# Patient Record
Sex: MALE | Race: WHITE | Employment: OTHER | URBAN - METROPOLITAN AREA
[De-identification: names, ages, dates, MRNs, and addresses within clinical notes are randomized per-mention and may not be internally consistent; named-entity substitution may affect disease eponyms.]

---

## 2017-05-12 PROBLEM — D70.9 NEUTROPENIA (HCC): Status: ACTIVE | Noted: 2017-05-12

## 2017-05-12 PROBLEM — D70.9 NEUTROPENIA: Status: ACTIVE | Noted: 2017-05-12

## 2017-05-17 PROBLEM — D70.9 NEUTROPENIA, UNSPECIFIED TYPE (HCC): Status: ACTIVE | Noted: 2017-05-17

## 2017-05-17 PROBLEM — E66.01 SEVERE OBESITY (BMI 35.0-35.9 WITH COMORBIDITY) (HCC): Status: ACTIVE | Noted: 2017-05-17

## 2017-05-17 PROBLEM — D70.9 NEUTROPENIA (HCC): Status: RESOLVED | Noted: 2017-05-12 | Resolved: 2017-05-17

## 2017-05-17 PROBLEM — D70.9 NEUTROPENIA: Status: RESOLVED | Noted: 2017-05-12 | Resolved: 2017-05-17

## 2017-05-17 PROBLEM — D70.9 NEUTROPENIA, UNSPECIFIED TYPE: Status: ACTIVE | Noted: 2017-05-17

## 2017-08-04 PROCEDURE — 88305 TISSUE EXAM BY PATHOLOGIST: CPT | Performed by: INTERNAL MEDICINE

## 2017-09-15 PROCEDURE — 87147 CULTURE TYPE IMMUNOLOGIC: CPT | Performed by: UROLOGY

## 2017-09-15 PROCEDURE — 87086 URINE CULTURE/COLONY COUNT: CPT | Performed by: UROLOGY

## 2017-10-13 PROCEDURE — 36415 COLL VENOUS BLD VENIPUNCTURE: CPT | Performed by: UROLOGY

## 2017-10-13 PROCEDURE — 84153 ASSAY OF PSA TOTAL: CPT | Performed by: UROLOGY

## 2017-11-07 PROBLEM — E66.01 SEVERE OBESITY (BMI 35.0-35.9 WITH COMORBIDITY) (HCC): Status: RESOLVED | Noted: 2017-05-17 | Resolved: 2017-11-07

## 2017-11-17 PROBLEM — E66.812 CLASS 2 SEVERE OBESITY DUE TO EXCESS CALORIES WITH SERIOUS COMORBIDITY AND BODY MASS INDEX (BMI) OF 39.0 TO 39.9 IN ADULT (HCC): Status: ACTIVE | Noted: 2017-05-17

## 2017-11-17 PROBLEM — N40.1 BPH WITH OBSTRUCTION/LOWER URINARY TRACT SYMPTOMS: Status: ACTIVE | Noted: 2017-11-17

## 2017-11-17 PROBLEM — E66.01 CLASS 2 SEVERE OBESITY DUE TO EXCESS CALORIES WITH SERIOUS COMORBIDITY AND BODY MASS INDEX (BMI) OF 39.0 TO 39.9 IN ADULT (HCC): Status: ACTIVE | Noted: 2017-05-17

## 2017-11-17 PROBLEM — N13.8 BPH WITH OBSTRUCTION/LOWER URINARY TRACT SYMPTOMS: Status: ACTIVE | Noted: 2017-11-17

## 2018-01-17 PROBLEM — E66.01 MORBID OBESITY WITH BMI OF 40.0-44.9, ADULT (HCC): Status: ACTIVE | Noted: 2018-01-17

## 2018-01-17 PROBLEM — E66.01 CLASS 2 SEVERE OBESITY DUE TO EXCESS CALORIES WITH SERIOUS COMORBIDITY AND BODY MASS INDEX (BMI) OF 39.0 TO 39.9 IN ADULT (HCC): Status: RESOLVED | Noted: 2017-05-17 | Resolved: 2018-01-17

## 2018-01-17 PROBLEM — E66.812 CLASS 2 SEVERE OBESITY DUE TO EXCESS CALORIES WITH SERIOUS COMORBIDITY AND BODY MASS INDEX (BMI) OF 39.0 TO 39.9 IN ADULT (HCC): Status: RESOLVED | Noted: 2017-05-17 | Resolved: 2018-01-17

## 2018-10-09 PROCEDURE — 86803 HEPATITIS C AB TEST: CPT | Performed by: INTERNAL MEDICINE

## 2018-10-18 PROCEDURE — 87086 URINE CULTURE/COLONY COUNT: CPT | Performed by: UROLOGY

## 2018-10-18 PROCEDURE — 36415 COLL VENOUS BLD VENIPUNCTURE: CPT | Performed by: UROLOGY

## 2018-10-18 PROCEDURE — 84153 ASSAY OF PSA TOTAL: CPT | Performed by: UROLOGY

## 2018-10-18 PROCEDURE — 81015 MICROSCOPIC EXAM OF URINE: CPT | Performed by: UROLOGY

## 2019-05-01 PROBLEM — E88.81 INSULIN RESISTANCE: Status: ACTIVE | Noted: 2019-05-01

## 2019-05-01 PROBLEM — K76.0 FATTY LIVER: Status: ACTIVE | Noted: 2019-05-01

## 2019-05-01 PROBLEM — E88.819 INSULIN RESISTANCE: Status: ACTIVE | Noted: 2019-05-01

## 2019-05-02 PROCEDURE — 82397 CHEMILUMINESCENT ASSAY: CPT | Performed by: INTERNAL MEDICINE

## 2019-05-02 PROCEDURE — 86141 C-REACTIVE PROTEIN HS: CPT | Performed by: INTERNAL MEDICINE

## 2019-05-02 PROCEDURE — 36415 COLL VENOUS BLD VENIPUNCTURE: CPT | Performed by: INTERNAL MEDICINE

## 2019-08-21 PROCEDURE — 84156 ASSAY OF PROTEIN URINE: CPT | Performed by: INTERNAL MEDICINE

## 2019-08-21 PROCEDURE — 82570 ASSAY OF URINE CREATININE: CPT | Performed by: INTERNAL MEDICINE

## 2019-08-27 PROBLEM — E66.9 OBESITY (BMI 30-39.9): Status: ACTIVE | Noted: 2019-08-27

## 2019-08-27 PROBLEM — E66.01 MORBID OBESITY WITH BMI OF 40.0-44.9, ADULT (HCC): Status: RESOLVED | Noted: 2018-01-17 | Resolved: 2019-08-27

## 2019-08-28 PROCEDURE — 81001 URINALYSIS AUTO W/SCOPE: CPT | Performed by: INTERNAL MEDICINE

## 2020-01-31 PROBLEM — E66.01 MORBID OBESITY WITH BMI OF 40.0-44.9, ADULT (HCC): Status: RESOLVED | Noted: 2018-01-17 | Resolved: 2020-01-31

## 2020-06-29 PROBLEM — E66.01 SEVERE OBESITY (BMI 35.0-35.9 WITH COMORBIDITY) (HCC): Status: ACTIVE | Noted: 2020-06-29

## 2020-07-23 PROBLEM — E66.9 OBESITY (BMI 30-39.9): Status: RESOLVED | Noted: 2019-08-27 | Resolved: 2020-07-23

## 2020-08-31 PROBLEM — R73.03 PREDIABETES: Status: ACTIVE | Noted: 2020-08-31

## 2021-06-16 DIAGNOSIS — D72.819 LEUKOPENIA: Primary | ICD-10-CM

## 2021-06-16 PROCEDURE — 88341 IMHCHEM/IMCYTCHM EA ADD ANTB: CPT | Performed by: INTERNAL MEDICINE

## 2021-06-16 PROCEDURE — 88342 IMHCHEM/IMCYTCHM 1ST ANTB: CPT | Performed by: INTERNAL MEDICINE

## 2021-06-16 PROCEDURE — 88313 SPECIAL STAINS GROUP 2: CPT | Performed by: INTERNAL MEDICINE

## 2021-06-16 PROCEDURE — 85097 BONE MARROW INTERPRETATION: CPT | Performed by: INTERNAL MEDICINE

## 2021-06-16 PROCEDURE — 88305 TISSUE EXAM BY PATHOLOGIST: CPT | Performed by: INTERNAL MEDICINE

## 2021-07-21 PROBLEM — N18.31 STAGE 3A CHRONIC KIDNEY DISEASE (HCC): Status: ACTIVE | Noted: 2021-07-21

## 2021-07-21 PROBLEM — E66.01 SEVERE OBESITY (BMI 35.0-35.9 WITH COMORBIDITY) (HCC): Status: RESOLVED | Noted: 2020-06-29 | Resolved: 2021-07-21

## 2021-07-21 PROBLEM — I12.9 HYPERTENSIVE KIDNEY DISEASE WITH STAGE 3 CHRONIC KIDNEY DISEASE, UNSPECIFIED WHETHER STAGE 3A OR 3B CKD (HCC): Status: ACTIVE | Noted: 2021-07-21

## 2021-07-21 PROBLEM — N18.30 HYPERTENSIVE KIDNEY DISEASE WITH STAGE 3 CHRONIC KIDNEY DISEASE, UNSPECIFIED WHETHER STAGE 3A OR 3B CKD (HCC): Status: ACTIVE | Noted: 2021-07-21

## 2025-01-24 ENCOUNTER — HOSPITAL ENCOUNTER (OUTPATIENT)
Facility: HOSPITAL | Age: 75
Setting detail: OBSERVATION
Discharge: HOME | End: 2025-01-27
Attending: EMERGENCY MEDICINE | Admitting: HOSPITALIST
Payer: MEDICARE

## 2025-01-24 DIAGNOSIS — N17.9 AKI (ACUTE KIDNEY INJURY): ICD-10-CM

## 2025-01-24 DIAGNOSIS — R73.9 HYPERGLYCEMIA: Primary | ICD-10-CM

## 2025-01-24 DIAGNOSIS — E11.65 TYPE 2 DIABETES MELLITUS WITH HYPERGLYCEMIA, WITHOUT LONG-TERM CURRENT USE OF INSULIN (HCC): ICD-10-CM

## 2025-01-24 LAB
ALBUMIN SERPL-MCNC: 3.9 G/DL (ref 3.2–4.8)
ALBUMIN/GLOB SERPL: 0.9 {RATIO} (ref 1–2)
ALP LIVER SERPL-CCNC: 65 U/L
ALT SERPL-CCNC: 63 U/L
ANION GAP SERPL CALC-SCNC: 11 MMOL/L (ref 0–18)
AST SERPL-CCNC: 80 U/L (ref ?–34)
BASE EXCESS BLDV CALC-SCNC: 0 MMOL/L
BASOPHILS # BLD AUTO: 0.05 X10(3) UL (ref 0–0.2)
BASOPHILS NFR BLD AUTO: 1.1 %
BILIRUB SERPL-MCNC: 0.5 MG/DL (ref 0.2–1.1)
BILIRUB UR QL STRIP.AUTO: NEGATIVE
BUN BLD-MCNC: 43 MG/DL (ref 9–23)
CALCIUM BLD-MCNC: 9.2 MG/DL (ref 8.7–10.6)
CHLORIDE SERPL-SCNC: 97 MMOL/L (ref 98–112)
CO2 SERPL-SCNC: 24 MMOL/L (ref 21–32)
COLOR UR AUTO: YELLOW
CREAT BLD-MCNC: 2.09 MG/DL
EGFRCR SERPLBLD CKD-EPI 2021: 33 ML/MIN/1.73M2 (ref 60–?)
EOSINOPHIL # BLD AUTO: 0.18 X10(3) UL (ref 0–0.7)
EOSINOPHIL NFR BLD AUTO: 3.8 %
ERYTHROCYTE [DISTWIDTH] IN BLOOD BY AUTOMATED COUNT: 13.3 %
GLOBULIN PLAS-MCNC: 4.4 G/DL (ref 2–3.5)
GLUCOSE BLD-MCNC: 392 MG/DL (ref 70–99)
GLUCOSE BLD-MCNC: 457 MG/DL (ref 70–99)
GLUCOSE BLD-MCNC: 482 MG/DL (ref 70–99)
GLUCOSE BLD-MCNC: 519 MG/DL (ref 70–99)
GLUCOSE UR STRIP.AUTO-MCNC: >1000 MG/DL
HCO3 BLDV-SCNC: 24 MEQ/L (ref 22–26)
HCT VFR BLD AUTO: 39.1 %
HGB BLD-MCNC: 13.7 G/DL
IMM GRANULOCYTES # BLD AUTO: 0.02 X10(3) UL (ref 0–1)
IMM GRANULOCYTES NFR BLD: 0.4 %
KETONES UR STRIP.AUTO-MCNC: NEGATIVE MG/DL
LEUKOCYTE ESTERASE UR QL STRIP.AUTO: 500
LYMPHOCYTES # BLD AUTO: 1.36 X10(3) UL (ref 1–4)
LYMPHOCYTES NFR BLD AUTO: 28.6 %
MCH RBC QN AUTO: 31.6 PG (ref 26–34)
MCHC RBC AUTO-ENTMCNC: 35 G/DL (ref 31–37)
MCV RBC AUTO: 90.3 FL
MONOCYTES # BLD AUTO: 0.5 X10(3) UL (ref 0.1–1)
MONOCYTES NFR BLD AUTO: 10.5 %
NEUTROPHILS # BLD AUTO: 2.65 X10 (3) UL (ref 1.5–7.7)
NEUTROPHILS # BLD AUTO: 2.65 X10(3) UL (ref 1.5–7.7)
NEUTROPHILS NFR BLD AUTO: 55.6 %
NITRITE UR QL STRIP.AUTO: NEGATIVE
OSMOLALITY SERPL CALC.SUM OF ELEC: 306 MOSM/KG (ref 275–295)
OXYHGB MFR BLDV: 63.5 % (ref 72–78)
PCO2 BLDV: 43 MM HG (ref 38–50)
PH BLDV: 7.38 [PH] (ref 7.33–7.43)
PH UR STRIP.AUTO: 6 [PH] (ref 5–8)
PLATELET # BLD AUTO: 154 10(3)UL (ref 150–450)
PO2 BLDV: 39 MM HG (ref 30–50)
POTASSIUM SERPL-SCNC: 4.9 MMOL/L (ref 3.5–5.1)
PROT SERPL-MCNC: 8.3 G/DL (ref 5.7–8.2)
PROT UR STRIP.AUTO-MCNC: 30 MG/DL
RBC # BLD AUTO: 4.33 X10(6)UL
RBC #/AREA URNS AUTO: >10 /HPF
SODIUM SERPL-SCNC: 132 MMOL/L (ref 136–145)
SP GR UR STRIP.AUTO: 1.02 (ref 1–1.03)
UROBILINOGEN UR STRIP.AUTO-MCNC: NORMAL MG/DL
WBC # BLD AUTO: 4.8 X10(3) UL (ref 4–11)
WBC #/AREA URNS AUTO: >50 /HPF

## 2025-01-24 RX ORDER — INSULIN ASPART 100 [IU]/ML
0.1 INJECTION, SOLUTION INTRAVENOUS; SUBCUTANEOUS ONCE
Status: COMPLETED | OUTPATIENT
Start: 2025-01-24 | End: 2025-01-24

## 2025-01-24 RX ORDER — TAMSULOSIN HYDROCHLORIDE 0.4 MG/1
0.4 CAPSULE ORAL EVERY MORNING
COMMUNITY

## 2025-01-24 RX ORDER — TOLTERODINE 4 MG/1
4 CAPSULE, EXTENDED RELEASE ORAL DAILY
COMMUNITY

## 2025-01-25 PROBLEM — N17.9 AKI (ACUTE KIDNEY INJURY): Status: ACTIVE | Noted: 2025-01-25

## 2025-01-25 LAB
ANION GAP SERPL CALC-SCNC: 10 MMOL/L (ref 0–18)
BUN BLD-MCNC: 35 MG/DL (ref 9–23)
CALCIUM BLD-MCNC: 8.9 MG/DL (ref 8.7–10.6)
CHLORIDE SERPL-SCNC: 105 MMOL/L (ref 98–112)
CO2 SERPL-SCNC: 22 MMOL/L (ref 21–32)
CREAT BLD-MCNC: 1.68 MG/DL
CREAT UR-SCNC: 91.6 MG/DL
EGFRCR SERPLBLD CKD-EPI 2021: 43 ML/MIN/1.73M2 (ref 60–?)
ERYTHROCYTE [DISTWIDTH] IN BLOOD BY AUTOMATED COUNT: 13.7 %
EST. AVERAGE GLUCOSE BLD GHB EST-MCNC: 312 MG/DL (ref 68–126)
GLUCOSE BLD-MCNC: 190 MG/DL (ref 70–99)
GLUCOSE BLD-MCNC: 206 MG/DL (ref 70–99)
GLUCOSE BLD-MCNC: 227 MG/DL (ref 70–99)
GLUCOSE BLD-MCNC: 281 MG/DL (ref 70–99)
GLUCOSE BLD-MCNC: 299 MG/DL (ref 70–99)
GLUCOSE BLD-MCNC: 503 MG/DL (ref 70–99)
HBA1C MFR BLD: 12.5 % (ref ?–5.7)
HCT VFR BLD AUTO: 39 %
HGB BLD-MCNC: 13.3 G/DL
MAGNESIUM SERPL-MCNC: 2 MG/DL (ref 1.6–2.6)
MCH RBC QN AUTO: 31.6 PG (ref 26–34)
MCHC RBC AUTO-ENTMCNC: 34.1 G/DL (ref 31–37)
MCV RBC AUTO: 92.6 FL
OSMOLALITY SERPL CALC.SUM OF ELEC: 298 MOSM/KG (ref 275–295)
PLATELET # BLD AUTO: 155 10(3)UL (ref 150–450)
POTASSIUM SERPL-SCNC: 4.6 MMOL/L (ref 3.5–5.1)
RBC # BLD AUTO: 4.21 X10(6)UL
SODIUM SERPL-SCNC: 137 MMOL/L (ref 136–145)
SODIUM SERPL-SCNC: 50 MMOL/L
WBC # BLD AUTO: 4.7 X10(3) UL (ref 4–11)

## 2025-01-25 RX ORDER — FINASTERIDE 5 MG/1
5 TABLET, FILM COATED ORAL
Status: DISCONTINUED | OUTPATIENT
Start: 2025-01-25 | End: 2025-01-27

## 2025-01-25 RX ORDER — HEPARIN SODIUM 5000 [USP'U]/ML
5000 INJECTION, SOLUTION INTRAVENOUS; SUBCUTANEOUS EVERY 8 HOURS SCHEDULED
Status: DISCONTINUED | OUTPATIENT
Start: 2025-01-25 | End: 2025-01-27

## 2025-01-25 RX ORDER — NICOTINE POLACRILEX 4 MG
15 LOZENGE BUCCAL
Status: DISCONTINUED | OUTPATIENT
Start: 2025-01-25 | End: 2025-01-27

## 2025-01-25 RX ORDER — SODIUM CHLORIDE 9 MG/ML
INJECTION, SOLUTION INTRAVENOUS CONTINUOUS
Status: DISCONTINUED | OUTPATIENT
Start: 2025-01-25 | End: 2025-01-26

## 2025-01-25 RX ORDER — DEXTROSE MONOHYDRATE 25 G/50ML
50 INJECTION, SOLUTION INTRAVENOUS
Status: DISCONTINUED | OUTPATIENT
Start: 2025-01-25 | End: 2025-01-27

## 2025-01-25 RX ORDER — CARVEDILOL 12.5 MG/1
25 TABLET ORAL 2 TIMES DAILY WITH MEALS
Status: DISCONTINUED | OUTPATIENT
Start: 2025-01-25 | End: 2025-01-27

## 2025-01-25 RX ORDER — TOPIRAMATE 25 MG/1
50 TABLET, FILM COATED ORAL 2 TIMES DAILY
Status: DISCONTINUED | OUTPATIENT
Start: 2025-01-25 | End: 2025-01-25 | Stop reason: ALTCHOICE

## 2025-01-25 RX ORDER — ACETAMINOPHEN 500 MG
500 TABLET ORAL EVERY 4 HOURS PRN
Status: DISCONTINUED | OUTPATIENT
Start: 2025-01-25 | End: 2025-01-27

## 2025-01-25 RX ORDER — CLONIDINE HYDROCHLORIDE 0.1 MG/1
0.1 TABLET ORAL 2 TIMES DAILY
Status: DISCONTINUED | OUTPATIENT
Start: 2025-01-25 | End: 2025-01-27

## 2025-01-25 RX ORDER — ONDANSETRON 2 MG/ML
4 INJECTION INTRAMUSCULAR; INTRAVENOUS EVERY 4 HOURS PRN
Status: ACTIVE | OUTPATIENT
Start: 2025-01-25 | End: 2025-01-25

## 2025-01-25 RX ORDER — NICOTINE POLACRILEX 4 MG
30 LOZENGE BUCCAL
Status: DISCONTINUED | OUTPATIENT
Start: 2025-01-25 | End: 2025-01-27

## 2025-01-25 RX ORDER — INSULIN DEGLUDEC 100 U/ML
20 INJECTION, SOLUTION SUBCUTANEOUS NIGHTLY
Status: DISCONTINUED | OUTPATIENT
Start: 2025-01-25 | End: 2025-01-26

## 2025-01-25 NOTE — ED INITIAL ASSESSMENT (HPI)
Patient endorses PCP visit today. He had blood work done and advised to come in for Evaluation of Hyperglycemia. Patient notes increased thirst and weight loss. + Urinary Frequency. Glucose on his CMP from PCP was 406. No hx of DM2

## 2025-01-25 NOTE — PLAN OF CARE
Patient is Aox4, VSS on RA, denies any pain at this time, IV fluids are going per order, IV rocephin for UTI, Accuchecks per order, insulin coverage per sliding scale, monitoring labs, call light and belongings within reach.

## 2025-01-25 NOTE — ED PROVIDER NOTES
Patient Seen in: [facility] Emergency Department      History     Chief Complaint   Patient presents with    Hyperglycemia     Stated Complaint: BG >400 per MD    Subjective:   HPI      75-year-old male presents today for evaluation of abnormal labs.  Patient was called by his primary care doctor today because his glucose was noted to be 406.  Patient states he has had urinary frequency for a long time.  He also reports fatigue as well.  He denies any fevers, pain, vomiting or dysuria.  He denies any previous history of diabetes.    Objective:     Past Medical History:    BPH (benign prostatic hyperplasia)    Elevated prostate specific antigen (PSA)    HYPERTENSION    Morbid obesity with BMI of 40.0-44.9, adult (HCC)    Obesity (BMI 30-39.9)    UTI (urinary tract infection)    Visual impairment    cataracts              Past Surgical History:   Procedure Laterality Date    Cataract      Colonoscopy N/A 8/4/2017    Procedure: COLONOSCOPY, POSSIBLE BIOPSY, POSSIBLE POLYPECTOMY 62965;  Surgeon: Michi Mercado MD;  Location: [facility]    Other surgical history  4/18/08    prostate bx-Dr. Saini    Other surgical history  1986    varicocelectomy    Other surgical history  3/5/15    Cysto-Dr. Saini     Other surgical history  3/18/15    Cysto, TURP w/ Bipolar- Dr. Saini    Other surgical history  3/18/2016    Flow  - Dr. Saini     Other surgical history  12/30/16    Flow  Dr Saini    Remv cataract extracap,insert lens  10/26/2011    Performed by SUSANA VIVAS at [facility]                Social History     Socioeconomic History    Marital status:    Tobacco Use    Smoking status: Never    Smokeless tobacco: Never   Vaping Use    Vaping status: Never Used   Substance and Sexual Activity    Alcohol use: Not Currently     Alcohol/week: 0.0 - 1.0 standard drinks of alcohol     Comment: occ    Drug use: No                  Physical Exam     ED Triage Vitals [01/24/25 1854]   /81   Pulse  77   Resp 18   Temp 97.2 °F (36.2 °C)   Temp src Temporal   SpO2 97 %   O2 Device None (Room air)       Current Vitals:   Vital Signs  BP: (!) 147/97  Pulse: 75  Resp: 17  Temp: 97.2 °F (36.2 °C)  Temp src: Temporal  MAP (mmHg): (!) 109    Oxygen Therapy  SpO2: 100 %  O2 Device: None (Room air)        Physical Exam  Vitals and nursing note reviewed.   Constitutional:       Appearance: Normal appearance.   HENT:      Head: Normocephalic.      Nose: Nose normal.      Mouth/Throat:      Mouth: Mucous membranes are moist.   Eyes:      Extraocular Movements: Extraocular movements intact.   Cardiovascular:      Rate and Rhythm: Normal rate.   Pulmonary:      Effort: Pulmonary effort is normal.   Abdominal:      General: Abdomen is flat.   Musculoskeletal:         General: Normal range of motion.   Skin:     General: Skin is warm.   Neurological:      General: No focal deficit present.      Mental Status: He is alert.   Psychiatric:         Mood and Affect: Mood normal.             ED Course     Labs Reviewed   COMP METABOLIC PANEL (14) - Abnormal; Notable for the following components:       Result Value    Glucose 482 (*)     Sodium 132 (*)     Chloride 97 (*)     BUN 43 (*)     Creatinine 2.09 (*)     Calculated Osmolality 306 (*)     eGFR-Cr 33 (*)     AST 80 (*)     ALT 63 (*)     Total Protein 8.3 (*)     Globulin  4.4 (*)     A/G Ratio 0.9 (*)     All other components within normal limits   VENOUS BLOOD GAS - Abnormal; Notable for the following components:    Venous O2Hb 63.5 (*)     All other components within normal limits   POCT GLUCOSE - Abnormal; Notable for the following components:    POC Glucose 519 (*)     All other components within normal limits   CBC WITH DIFFERENTIAL WITH PLATELET   URINALYSIS, ROUTINE   RAINBOW DRAW LAVENDER   RAINBOW DRAW LIGHT GREEN   RAINBOW DRAW BLUE     EKG    Rate, intervals and axes as noted on EKG Report.  Rate: 77  Rhythm: Sinus Rhythm  Reading: No STEMI                        MDM      Differential Diagnosis  75-year-old male was recently experienced fatigue and polyuria presents today for evaluation of hyperglycemia noted on outpatient labs.  He is hemodynamically stable in no acute distress.  Will obtain labs to assess for signs of DKA or HHS.  Plan for UA to assess for UTI.  Will give fluids and reassess.    9:00 pm  Patient's pH, bicarb and anion gap are normal.  Presently, I do not suspect DKA.  His glucose is 482, and improved with IV fluids and insulin.  His BUN is 43 and his creatinine is 2.09, higher than previous levels.  I suspect some JOSSELYN from his polyuria secondary to hyperglycemia.  Will admit to the hospital for further care.  I notified the hospitalist of need for admission.    External Chart Reviewed  I reviewed his outpatient labs performed earlier today    Discussions of Management  I notified the hospitalist of need for admission.        MDM    Disposition and Plan     Clinical Impression:  1. Hyperglycemia    2. JOSSELYN (acute kidney injury) (HCC)         Disposition:  There is no disposition on file for this visit.  There is no disposition time on file for this visit.    Follow-up:  No follow-up provider specified.        Medications Prescribed:  Current Discharge Medication List              Supplementary Documentation:

## 2025-01-25 NOTE — SPIRITUAL CARE NOTE
Spiritual Care Visit Note    Patient Name: Louis Gibson Date of Spiritual Care Visit: 25   : 1950 Primary Dx: Hyperglycemia   Referred By: ED rounds    Spiritual Care Taxonomy:    Intended Effects: Demonstrate caring and concern    Methods: Encourage sharing of feelings;Encourage story-telling;Explore cultural values;Offer support    Interventions: Acknowledge current situation;Active listening;Ask guided questions;Ask questions to bring forth feelings;Identify supportive relationship(s);Explain  role;Discuss concerns;Discuss plan of care;Share words of hope and inspiration    Visit Type/Summary:  Louis was sitting up in bed and visiting with his wife when  arrived at his room.  He discussed how he got to ED, knew the prognosis but had not yet formed a lifestyle plan to deal with his health issues.  He and his wife seemed in good spirits, were very supportive of each other and their respective family/community,  He seems to have a good partner in his wife who will help him adhere to healthy eating habits.  Both feel he is very active in the summer with their yard, but may not have developed a good exercise program or activity in the winter - \"unless it snows.\"   - Spiritual Care: Consulted with RN prior to visit. Offered empathic listening and emotional support. Provided information regarding how to contact Spiritual Care.  remains available as needed for follow up.    Spiritual Care support can be requested via an Epic consult. For urgent/immediate needs, please contact the On Call  at: [facility]: ext [phone]    Brenda Molina MDiv.  Staff

## 2025-01-25 NOTE — ED QUICK NOTES
Orders for admission, patient is aware of plan and ready to go upstairs. Any questions, please call ED RN Elizabeth at extension [phone].     Patient Covid vaccination status: Fully vaccinated     COVID Test Ordered in ED: None    COVID Suspicion at Admission: N/A    Running Infusions:      Mental Status/LOC at time of transport: x4    Other pertinent information:   CIWA score: N/A   NIH score:  N/A

## 2025-01-26 LAB
ALBUMIN SERPL-MCNC: 3.9 G/DL (ref 3.2–4.8)
ALBUMIN/GLOB SERPL: 0.9 {RATIO} (ref 1–2)
ALP LIVER SERPL-CCNC: 55 U/L
ALT SERPL-CCNC: 61 U/L
ANION GAP SERPL CALC-SCNC: 10 MMOL/L (ref 0–18)
AST SERPL-CCNC: 89 U/L (ref ?–34)
BASOPHILS # BLD AUTO: 0.05 X10(3) UL (ref 0–0.2)
BASOPHILS NFR BLD AUTO: 1.3 %
BILIRUB SERPL-MCNC: 0.6 MG/DL (ref 0.2–1.1)
BUN BLD-MCNC: 28 MG/DL (ref 9–23)
CALCIUM BLD-MCNC: 8.7 MG/DL (ref 8.7–10.6)
CHLORIDE SERPL-SCNC: 108 MMOL/L (ref 98–112)
CO2 SERPL-SCNC: 22 MMOL/L (ref 21–32)
CREAT BLD-MCNC: 1.65 MG/DL
EGFRCR SERPLBLD CKD-EPI 2021: 44 ML/MIN/1.73M2 (ref 60–?)
EOSINOPHIL # BLD AUTO: 0.18 X10(3) UL (ref 0–0.7)
EOSINOPHIL NFR BLD AUTO: 4.6 %
ERYTHROCYTE [DISTWIDTH] IN BLOOD BY AUTOMATED COUNT: 13.8 %
GLOBULIN PLAS-MCNC: 4.3 G/DL (ref 2–3.5)
GLUCOSE BLD-MCNC: 185 MG/DL (ref 70–99)
GLUCOSE BLD-MCNC: 206 MG/DL (ref 70–99)
GLUCOSE BLD-MCNC: 222 MG/DL (ref 70–99)
GLUCOSE BLD-MCNC: 224 MG/DL (ref 70–99)
GLUCOSE BLD-MCNC: 296 MG/DL (ref 70–99)
HCT VFR BLD AUTO: 37.5 %
HGB BLD-MCNC: 13.1 G/DL
IMM GRANULOCYTES # BLD AUTO: 0.01 X10(3) UL (ref 0–1)
IMM GRANULOCYTES NFR BLD: 0.3 %
LYMPHOCYTES # BLD AUTO: 1.73 X10(3) UL (ref 1–4)
LYMPHOCYTES NFR BLD AUTO: 43.9 %
MCH RBC QN AUTO: 32.3 PG (ref 26–34)
MCHC RBC AUTO-ENTMCNC: 34.9 G/DL (ref 31–37)
MCV RBC AUTO: 92.4 FL
MONOCYTES # BLD AUTO: 0.68 X10(3) UL (ref 0.1–1)
MONOCYTES NFR BLD AUTO: 17.3 %
NEUTROPHILS # BLD AUTO: 1.29 X10 (3) UL (ref 1.5–7.7)
NEUTROPHILS # BLD AUTO: 1.29 X10(3) UL (ref 1.5–7.7)
NEUTROPHILS NFR BLD AUTO: 32.6 %
OSMOLALITY SERPL CALC.SUM OF ELEC: 300 MOSM/KG (ref 275–295)
PLATELET # BLD AUTO: 147 10(3)UL (ref 150–450)
POTASSIUM SERPL-SCNC: 4.9 MMOL/L (ref 3.5–5.1)
PROT SERPL-MCNC: 8.2 G/DL (ref 5.7–8.2)
RBC # BLD AUTO: 4.06 X10(6)UL
SODIUM SERPL-SCNC: 140 MMOL/L (ref 136–145)
WBC # BLD AUTO: 3.9 X10(3) UL (ref 4–11)

## 2025-01-26 RX ORDER — INSULIN DEGLUDEC 100 U/ML
24 INJECTION, SOLUTION SUBCUTANEOUS NIGHTLY
Status: DISCONTINUED | OUTPATIENT
Start: 2025-01-26 | End: 2025-01-27

## 2025-01-26 NOTE — DIETARY NOTE
[facility]   part of [facility]   CLINICAL NUTRITION    Louis Gibson     Admitting diagnosis:  Hyperglycemia [R73.9]  JOSSELYN (acute kidney injury) (HCC) [N17.9]    Ht: 175.3 cm (5' 9.02\")  Wt: 113.4 kg (250 lb).   Body mass index is 36.9 kg/m².  IBW: 72.7kg    Wt Readings from Last 6 Encounters:   01/25/25 113.4 kg (250 lb)   04/06/22 122.8 kg (270 lb 12.8 oz)   03/24/22 123.5 kg (272 lb 3.2 oz)   01/24/22 121.1 kg (267 lb)   01/21/22 123.8 kg (273 lb)   10/25/21 118.4 kg (261 lb)        Labs/Meds reviewed    Diet:       Procedures    Carbohydrate controlled diet 1800 kcal/60 grams; Is Patient on Accuchecks? Yes     Percent Meals Eaten (last 3 days)       Date/Time Percent Meals Eaten (%)    01/25/25 0830 100 %    01/26/25 0755 100 %            Pt chart reviewed d/t hyperglycemia.  75 year old male admitted with hyperglycemia and new DM dx  Visited this AM and reviewed CHO containing foods, CHO/protein and fat in BS control, importance of portion control, timing of meals and appropriate beverages. Admits to drinking large amounts of milk and eating big portions of fruit. Pt had a readiness to learn-no barriers noted- motivated and receptive. Will follow up when wife is present.   Patient reports good appetite at this time.  Nursing notes reports Percent Meals Eaten (%): 100 % intake for last meal.  Tolerating po diet without diarrhea, emesis, or constipation.   No significant weight changes noted per EPIC though pt states he lost about 17#.     Patient is at low nutrition risk at this time.    Please consult if patient status changes or nutrition issues arise.    Elenita Rodrigez RD, LDN  Clinical Nutrition  [phone]

## 2025-01-26 NOTE — DISCHARGE INSTRUCTIONS
Your hemoglobin A1c level came back as Last A1c value was 12.5% done 1/24/2025.  . If you are not already following a diabetic diet, it is recommended that you begin to do so. If you feel you need further help with managing your diet, an appointment with the diabetes learning center is recommended. The learning center can help you meal plan as well as find foods that work for you within your diet restrictions. Please let your doctor know if you are interested in the diabetes learning center. An MD consult will be needed.     Sliding Scale to follow after testing blood glucose:   This dose of insulin is given in addition to the 8 units of insulin injected three times daily with meals.    Give 1 unit for blood glucose 140-160 mg/dL  Give 2 units for blood glucose 161-180 mg/dL  Give 3 units for blood glucose 181-200 mg/dL  Give 4 units for blood glucose 201-220 mg/dL  Give 5 units for blood glucose 221-240 mg/dL  Give 6 units for blood glucose 241-260 mg/dL  Give 7 units for blood glucose 261-280 mg/dL  Give 8 units for blood glucose 281-300 mg/dL  Give 9 units for blood glucose 301-320 mg/dL  Give 10 units for blood glucose 321-340 mg/dL  Give 11 units for blood glucose 341-360 mg/dL  Call physician if blood glucose is greater than 360 mg/dL with time and last dose of correction insulin given.     Type 2 Diabetes Nutrition Therapy    Why Is Carbohydrate Counting Important?  Counting carbohydrate servings may help you control your blood glucose level so you feel better.  The balance between the carbohydrates you eat and insulin determines what your blood glucose level will be after eating.  Carbohydrate counting can also help you plan your meals.    Which Foods Have Carbohydrates?  Foods with carbohydrates include:     Breads, crackers, and cereals  Pasta, rice, and grains  Starchy vegetables, such as potatoes, corn, and peas  Beans and legumes  Milk, soy milk, and yogurt  Fruits and fruit juices  Sweets, such as  cakes, cookies, ice cream, jam, and jelly    Carbohydrate Servings  In diabetes meal planning, 1 serving of a food with carbohydrate has about 15 grams of carbohydrate:  Check serving sizes with measuring cups and spoons or a food scale.  Read the Nutrition Facts on food labels to find out how many grams of carbohydrate are in foods you eat.  The food lists in this handout show portions that have about 15 grams of carbohydrate.    Meal Planning Tips  An Eating Plan tells you how many carbohydrate servings to eat at your meals and snacks. For many adults, eating 3 to 5 servings of carbohydrate foods at each meal and 1 or 2 carbohydrate servings for each snack works well.    ~In a healthy daily Eating Plan, most carbohydrates come from:  At least 6 servings of fruits and nonstarchy vegetables  At least 6 servings of grains, beans, and starchy vegetables, with at least 3 servings from whole grains  At least 2 servings of milk or milk products  ~Check your blood glucose level regularly. It can tell you if you need to adjust when you eat carbohydrates.  ~Eating foods that have fiber, such as whole grains, and having very few salty foods is good for your health.  ~Eat 4 to 6 ounces of meat or other protein foods (such as soybean burgers) each day. Choose low-fat sources of protein, such as lean beef, lean pork, chicken, fish, low-fat cheese, or vegetarian foods such as soy.  ~Eat some healthy fats, such as olive oil, canola oil, and nuts.  ~Eat very little saturated fats. These unhealthy fats are found in butter, cream, and high-fat meats, such as leroy and sausage.  ~Eat very little or no trans fats. These unhealthy fats are found in all foods that list “partially hydrogenated oil” as an ingredient.    Label Reading Tips  The Nutrition Facts panel on a label lists the grams of total carbohydrate in 1 standard serving. The label’s standard serving may be larger or smaller than 1 carbohydrate serving.    To figure out how  many carbohydrate servings are in the food:    First, look at the label’s standard serving size.  Check the grams of total carbohydrate. This is the amount of carbohydrate in 1 standard serving.  Divide the grams of total carbohydrate by 15. This number equals the number of carbohydrate servings in 1 standard serving. Remember: 1 carbohydrate serving is 15 grams of carbohydrate.  Note: You may ignore the grams of sugars on the Nutrition Facts panel because they are included in the grams of total carbohydrate.    Foods Recommended  1 serving = about 15 grams of carbohydrate    Grains   1 slice bread (1 ounce)  1 tortilla (6-inch size)  ¼ large bagel (1 ounce)  2 taco shells (5-inch size)  ½ hamburger or hot dog bun (¾ ounce)  ¾ cup ready-to-eat unsweetened cereal  ½ cup cooked cereal  1 cup broth-based soup  4 to 6 small crackers  1/3 cup pasta or rice (cooked)  ½ cup beans, peas, corn, sweet potatoes, winter squash, or mashed or boiled potatoes (cooked)  ¼ large baked potato (3 ounces)  ¾ ounce pretzels, potato chips, or tortilla chips  3 cups popcorn (popped)    Fruit  1 small fresh fruit (¾ to 1 cup)  ½ cup canned or frozen fruit  2 tablespoons dried fruit (blueberries, cherries, cranberries, mixed fruit, raisins)  17 small grapes (3 ounces)  1 cup melon, berries  ½ cup unsweetened fruit juice    Milk  1 cup fat-free or reduced-fat milk  1 cup soy milk  2/3 cup (6 ounces) nonfat yogurt sweetened with sugar-free sweetener  Sweets and Desserts  2-inch square cake (unfrosted)  2 small cookies (2/3 ounce)  ½ cup ice cream or frozen yogurt  ¼ cup sherbet or sorbet  1 tablespoon syrup, jam, jelly, table sugar, or honey  2 tablespoons light syrup    Other Foods  Count 1 cup raw vegetables or ½ cup cooked nonstarchy vegetables as zero (0) carbohydrate servings or “free” foods. If you eat 3 or more servings at one meal, count them as 1 carbohydrate serving.  Foods that have less than 20 calories in each serving also may be  counted as zero carbohydrate servings or “free” foods.  Count 1 cup of casserole or other mixed foods as 2 carbohydrate servings.      Type 2 Diabetes Sample 1-Day Menu   Breakfast  1 slice whole grain toast (1 carbohydrate serving)  1 teaspoon trans-fat free margarine  1 egg omelet  1 orange (1 carbohydrate serving)  6 ounces low-fat, plain Greek yogurt (1 carbohydrate serving)    Lunch  2 ounces turkey breast  2 slices whole grain bread (2 carbohydrate servings)  Lettuce and tomato salad  1 small apple (1 carbohydrate serving)  1 cup cucumber slices (0.5 carbohydrate serving)    Evening Meal  3 ounces baked chicken  1 cup baked, mashed sweet potato (2 carbohydrate serving)  1/2 cup cooked broccoli  1 large green salad  1 cup fat-free skim milk (1 carbohydrate serving)  1 cup fresh raspberries (1 carbohydrate serving)    Evening Snack  1 tablespoon nuts  1/2 cup ice cream (1 carbohydrate serving)  1-1/4 cups strawberries (1 carbohydrate serving)    Elenita Rodrigez, RD  [phone]    Follow up for Diabetes care:    It is recommended that you follow up with an outpatient diabetes center.   Please obtain a referral from your primary care doctor if you do not already have one.   Below is the location and number of the [facility] nearest you.     [place] location:  [address]  [phone]    [place] location:  [address]  [phone]    [place] location:  [address]   [phone]    [place] location:  [address]   [phone]    [place] location:  [address]  [phone]    [place] location:  [address]  [phone]

## 2025-01-26 NOTE — PLAN OF CARE
A/Ox4, VSS, on RA. Pt denies any pain or discomfort.  tonight, Novolog per SS and Degludec Insulin given. Cont IV abx and IVF. Pt updated with plan of care, verbalized understanding.

## 2025-01-26 NOTE — PLAN OF CARE
Pt A&Ox4. VSS on RA. . Telemetry monitoring, NSR. SCD's declined. Up independently in room. Blood glucose treated per protocol, Novolog with meals as ordered. Diabetes education handouts given to pt. Further educated on importance of logging and treating blood glucose at home, verbalizes understanding. Plan for diabetes APRN to see tomorrow. Call light within reach. Up in chair at this time. Will continue to monitor.

## 2025-01-26 NOTE — PROGRESS NOTES
[facility] Hospitalist Progress Note     PCP: Pat Granger MD    SUBJECTIVE:  No CP, SOB, or N/V.    OBJECTIVE:  Temp:  [97.3 °F (36.3 °C)-98.2 °F (36.8 °C)] 97.7 °F (36.5 °C)  Pulse:  [57-73] 64  Resp:  [16-18] 16  BP: (103-135)/(47-79) 124/74  SpO2:  [95 %-98 %] 96 %    Intake/Output:    Intake/Output Summary (Last 24 hours) at 1/26/2025 1517  Last data filed at 1/26/2025 0755  Gross per 24 hour   Intake 1210 ml   Output 750 ml   Net 460 ml       Last 3 Weights   01/25/25 0023 250 lb (113.4 kg)   01/24/25 1854 250 lb (113.4 kg)   04/06/22 0849 270 lb 12.8 oz (122.8 kg)   03/24/22 0916 272 lb 3.2 oz (123.5 kg)       Exam  Physical Exam:  General: Alert, awake, cooperative.  HEENT:  Normocephalic, atraumatic.  Neck:  Trachea midline.  Neck supple.  Chest:  Clear to auscultation bilaterally. No wheezes, rales, or rhonchi.  CV:  Regular rate and rhythm.  Positive S1/S2. No murmur, no gallops, no rubs  GI: Bowel sounds present in all four quadrants, abdomen is soft, non-tender, non-distended.  Extremities:  No lower extremity edema or cyanosis.  Neurological:  AAOx4.  Moving all extremities.  Skin:  Warm and dry.      Data Review:       Labs:     Recent Labs   Lab 01/24/25 1859 01/25/25  0626 01/26/25  0500   WBC 4.8 4.7 3.9*   HGB 13.7 13.3 13.1   MCV 90.3 92.6 92.4   .0 155.0 147.0*       Recent Labs   Lab 01/24/25 1859 01/25/25  0626 01/26/25  0500   * 137 140   K 4.9 4.6 4.9   CL 97* 105 108   CO2 24.0 22.0 22.0   BUN 43* 35* 28*   CREATSERUM 2.09* 1.68* 1.65*   CA 9.2 8.9 8.7   MG  --  2.0  --    * 206* 185*       Recent Labs   Lab 01/24/25  1859 01/26/25  0500   ALT 63* 61*   AST 80* 89*   ALB 3.9 3.9       Recent Labs   Lab 01/25/25  1401 01/25/25  1829 01/25/25  2113 01/26/25  0506 01/26/25  1158   PGLU 281* 503* 190* 224* 296*       No results for input(s): \"TROP\" in the last 168 hours.        Meds:      insulin degludec  24 Units Subcutaneous Nightly    insulin aspart  8 Units Subcutaneous  TID CC    carvedilol  25 mg Oral BID with meals    cloNIDine  0.1 mg Oral BID    finasteride  5 mg Oral Daily    heparin  5,000 Units Subcutaneous Q8H LILA    cefTRIAXone  1 g Intravenous Q24H    insulin aspart  2-10 Units Subcutaneous TID AC and HS         acetaminophen    glucose **OR** glucose **OR** glucose-vitamin C **OR** dextrose **OR** glucose **OR** glucose **OR** glucose-vitamin C       Assessment/Plan:   75 year old male with PMH sig for HTN, BPH, DULCE MARIA who presents for abnormal labs.      #Acute cystitis  #Generalized weakness  -Continue Rocephin.  Urine culture was not ordered in the ED.  Added onto existing specimen.  -Encourage ambulation     #T2DM w/ hyperglycemia  HbA1C 12.5  -Start weight-based MDI regimen, degludec increased to 24 units nightly, NovoLog 8 units with meals, SSI  -Endocrinology consulted.  Patient will need further education regarding his diabetes and insulin.     #JOSSELYN on CKD, resolved  -Likely due to volume depletion from polyuria.  Discontinue IV fluids.  -Trend renal function     #HTN  -Continue Coreg and clonidine  -Hold lisinopril and Lasix for now    #BPH  -Continue     #BPH  -Flomax and finasteride        DVT prophylaxis: Heparin subcu  CODE STATUS: Full code    Vaughn Norwood DO  [facility]

## 2025-01-27 ENCOUNTER — TELEPHONE (OUTPATIENT)
Dept: ENDOCRINOLOGY | Facility: HOSPITAL | Age: 75
End: 2025-01-27

## 2025-01-27 VITALS
WEIGHT: 250 LBS | SYSTOLIC BLOOD PRESSURE: 127 MMHG | TEMPERATURE: 98 F | RESPIRATION RATE: 17 BRPM | OXYGEN SATURATION: 96 % | BODY MASS INDEX: 37.03 KG/M2 | HEIGHT: 69.02 IN | HEART RATE: 61 BPM | DIASTOLIC BLOOD PRESSURE: 79 MMHG

## 2025-01-27 DIAGNOSIS — E11.65 TYPE 2 DIABETES MELLITUS WITH HYPERGLYCEMIA, WITHOUT LONG-TERM CURRENT USE OF INSULIN (HCC): Primary | ICD-10-CM

## 2025-01-27 LAB
ANION GAP SERPL CALC-SCNC: 11 MMOL/L (ref 0–18)
BASOPHILS # BLD AUTO: 0.05 X10(3) UL (ref 0–0.2)
BASOPHILS NFR BLD AUTO: 1.3 %
BUN BLD-MCNC: 20 MG/DL (ref 9–23)
CALCIUM BLD-MCNC: 8.9 MG/DL (ref 8.7–10.6)
CHLORIDE SERPL-SCNC: 109 MMOL/L (ref 98–112)
CO2 SERPL-SCNC: 21 MMOL/L (ref 21–32)
CREAT BLD-MCNC: 1.47 MG/DL
EGFRCR SERPLBLD CKD-EPI 2021: 50 ML/MIN/1.73M2 (ref 60–?)
EOSINOPHIL # BLD AUTO: 0.14 X10(3) UL (ref 0–0.7)
EOSINOPHIL NFR BLD AUTO: 3.8 %
ERYTHROCYTE [DISTWIDTH] IN BLOOD BY AUTOMATED COUNT: 13.8 %
GLUCOSE BLD-MCNC: 181 MG/DL (ref 70–99)
GLUCOSE BLD-MCNC: 208 MG/DL (ref 70–99)
GLUCOSE BLD-MCNC: 237 MG/DL (ref 70–99)
HCT VFR BLD AUTO: 41.7 %
HGB BLD-MCNC: 13.7 G/DL
IMM GRANULOCYTES # BLD AUTO: 0.01 X10(3) UL (ref 0–1)
IMM GRANULOCYTES NFR BLD: 0.3 %
LYMPHOCYTES # BLD AUTO: 1.73 X10(3) UL (ref 1–4)
LYMPHOCYTES NFR BLD AUTO: 46.5 %
MCH RBC QN AUTO: 31.3 PG (ref 26–34)
MCHC RBC AUTO-ENTMCNC: 32.9 G/DL (ref 31–37)
MCV RBC AUTO: 95.2 FL
MONOCYTES # BLD AUTO: 0.59 X10(3) UL (ref 0.1–1)
MONOCYTES NFR BLD AUTO: 15.9 %
NEUTROPHILS # BLD AUTO: 1.2 X10 (3) UL (ref 1.5–7.7)
NEUTROPHILS # BLD AUTO: 1.2 X10(3) UL (ref 1.5–7.7)
NEUTROPHILS NFR BLD AUTO: 32.2 %
OSMOLALITY SERPL CALC.SUM OF ELEC: 299 MOSM/KG (ref 275–295)
PLATELET # BLD AUTO: 143 10(3)UL (ref 150–450)
POTASSIUM SERPL-SCNC: 4.4 MMOL/L (ref 3.5–5.1)
RBC # BLD AUTO: 4.38 X10(6)UL
SODIUM SERPL-SCNC: 141 MMOL/L (ref 136–145)
WBC # BLD AUTO: 3.7 X10(3) UL (ref 4–11)

## 2025-01-27 PROCEDURE — 99222 1ST HOSP IP/OBS MODERATE 55: CPT | Performed by: CLINICAL NURSE SPECIALIST

## 2025-01-27 RX ORDER — INSULIN DEGLUDEC 100 U/ML
28 INJECTION, SOLUTION SUBCUTANEOUS NIGHTLY
Qty: 8.4 ML | Refills: 0 | Status: SHIPPED | OUTPATIENT
Start: 2025-01-27 | End: 2025-01-27

## 2025-01-27 RX ORDER — LANCETS 33 GAUGE
EACH MISCELLANEOUS
Qty: 100 EACH | Refills: 6 | Status: SHIPPED | OUTPATIENT
Start: 2025-01-27

## 2025-01-27 RX ORDER — BLOOD SUGAR DIAGNOSTIC
STRIP MISCELLANEOUS
Qty: 100 STRIP | Refills: 6 | Status: SHIPPED | OUTPATIENT
Start: 2025-01-27

## 2025-01-27 RX ORDER — PEN NEEDLE, DIABETIC 32GX 5/32"
NEEDLE, DISPOSABLE MISCELLANEOUS
Qty: 100 EACH | Refills: 6 | Status: SHIPPED | OUTPATIENT
Start: 2025-01-27

## 2025-01-27 RX ORDER — INSULIN ASPART 100 [IU]/ML
INJECTION, SOLUTION INTRAVENOUS; SUBCUTANEOUS
Qty: 5 EACH | Refills: 3 | Status: SHIPPED | OUTPATIENT
Start: 2025-01-27

## 2025-01-27 RX ORDER — INSULIN GLARGINE 100 [IU]/ML
28 INJECTION, SOLUTION SUBCUTANEOUS NIGHTLY
Qty: 5 EACH | Refills: 3 | Status: SHIPPED | OUTPATIENT
Start: 2025-01-27

## 2025-01-27 RX ORDER — CEFPODOXIME PROXETIL 200 MG/1
400 TABLET, FILM COATED ORAL 2 TIMES DAILY
Qty: 16 TABLET | Refills: 0 | Status: SHIPPED | OUTPATIENT
Start: 2025-01-27 | End: 2025-01-31

## 2025-01-27 RX ORDER — BLOOD-GLUCOSE METER
EACH MISCELLANEOUS
Qty: 1 KIT | Refills: 0 | Status: SHIPPED | OUTPATIENT
Start: 2025-01-27

## 2025-01-27 RX ORDER — INSULIN DEGLUDEC 100 U/ML
28 INJECTION, SOLUTION SUBCUTANEOUS NIGHTLY
Status: DISCONTINUED | OUTPATIENT
Start: 2025-01-27 | End: 2025-01-27

## 2025-01-27 NOTE — DISCHARGE SUMMARY
[facility]   part of [facility]    DISCHARGE SUMMARY     Louis Gibson Patient Status:  Observation    1950 MRN [id number]   Location [facility] 3SW-A Attending Lavon Larios MD   Hosp Day # 0 PCP Pat Granger MD     Date of Admission: 2025  Date of Discharge: 2025  Discharge Disposition: Home or Self Care    Discharge Diagnosis: New onset DM type 2, Acute cystitis, JOSSELYN on CKD    History of Present Illness: 75 year old male with PMH sig for HTN, BPH, DULCE MARIA who presents for abnormal labs. Started on IV antibiotics.  Patient's culturesPatient was found to have new onset type 2 diabetes with hyperglycemia.  Patient was sent to the ER and admitted for further treatment.  Patient also found to have acute cystitis.  Patient started on IV Ceftin.  Patient's urine culture still pending.  Patient clinically improved from this.  Patient to be discharged on oral antibiotics to complete a 7-day antibiotic course.  Patient's type 2 diabetes also treated with insulin while hospitalized.  Given patient's hemoglobin A1c of 12.5 he will continue insulin on discharge.  Patient to be discharged on long-acting insulin 28 units subcu at night as well as fixed dose NovoLog 8 units with meals plus insulin sliding scale.  Endocrinology APN did see the patient and educated patient while hospitalized.  Patient also had mild JOSSELYN on CKD.  Patient started IV fluids with resolution of JOSSELYN.  Patient clinically stable, vital stable, labs stable for discharge.  While hospitalized patient's lisinopril and Lasix were held.  These will be continued on discharge.  Patient agreeable with discharge.  All consultants agree with discharge.      Discharge Medication List:     Discharge Medications        START taking these medications        Instructions Prescription details   BD Pen Needle Deepika U/F 32G X 4 MM Misc  Generic drug: Insulin Pen Needle      Inject 3 times per day. Use a new pen needle with each injection    Quantity: 100 each  Refills: 6     cefpodoxime 200 MG Tabs  Commonly known as: Vantin      Take 2 tablets (400 mg total) by mouth 2 (two) times daily for 4 days.   Stop taking on: January 31, 2025  Quantity: 16 tablet  Refills: 0     Lantus SoloStar 100 UNIT/ML Sopn  Generic drug: insulin glargine      Inject 28 Units into the skin nightly.   Quantity: 5 each  Refills: 3     NovoLOG FlexPen 100 UNIT/ML Sopn  Generic drug: insulin aspart      Inject 8 units into the skin before breakfast plus insulin sliding scale, Inject 8 units into the skin before lunch plus insulin sliding scale, Inject 8 units into the skin before dinner plus insulin sliding scale   Quantity: 5 each  Refills: 3     NovoLOG FlexPen 100 UNIT/ML Sopn  Generic drug: insulin aspart      Test blood glucose 3 times daily prior to meals. Use sliding scale provided to determine how many units to give in addition to fixed dose of 8 units per meal if eating   Quantity: 5 each  Refills: 3     insulin aspart 100 Units/mL Sopn  Commonly known as: NovoLOG      Inject 1-11 Units into the skin 3 (three) times daily with meals.   Refills: 0     OneTouch Delica Lancets 33G Misc      Test blood sugar 3 times per day.   Quantity: 100 each  Refills: 6     OneTouch Delica Lancets 33G Misc      Test blood sugar 3 times per day.   Quantity: 100 each  Refills: 6     OneTouch Verio Flex System w/Device Kit      Test blood sugar 3 times per day.   Quantity: 1 kit  Refills: 0     OneTouch Verio Flex System w/Device Kit      Test blood sugar 3 times per day.   Quantity: 1 kit  Refills: 0     OneTouch Verio Strp      Test blood sugar 3 times per day.   Quantity: 100 strip  Refills: 6     OneTouch Verio Strp      Test blood sugar 3 times per day.   Quantity: 100 strip  Refills: 6            CONTINUE taking these medications        Instructions Prescription details   carvedilol 25 MG Tabs  Commonly known as: Coreg      Take 1 tablet (25 mg total) by mouth 2 (two) times daily.    Quantity: 180 tablet  Refills: 3     cloNIDine 0.1 MG Tabs  Commonly known as: Catapres      Take 1 tablet (0.1 mg total) by mouth 2 (two) times daily.   Quantity: 180 tablet  Refills: 1     finasteride 5 MG Tabs  Commonly known as: Proscar      Take 1 tablet (5 mg total) by mouth once daily.   Quantity: 90 tablet  Refills: 3     furosemide 40 MG Tabs  Commonly known as: Lasix      Take 1 tablet (40 mg total) by mouth daily.   Quantity: 90 tablet  Refills: 3     lisinopril 40 MG Tabs  Commonly known as: Prinivil; Zestril      Take 1 tablet (40 mg total) by mouth daily.   Quantity: 90 tablet  Refills: 3     tamsulosin 0.4 MG Caps  Commonly known as: Flomax      Take 1 capsule (0.4 mg total) by mouth every morning.   Refills: 0     tolterodine ER 4 MG Cp24  Commonly known as: Detrol LA      Take 1 capsule (4 mg total) by mouth daily.   Refills: 0               Where to Get Your Medications        These medications were sent to [id number] - [place], [place] - [address]  [phone], [phone]  [address] , [facility] [address]      Phone: [phone]   BD Pen Needle Deepika U/F 32G X 4 MM Misc  cefpodoxime 200 MG Tabs  Lantus SoloStar 100 UNIT/ML Sopn  NovoLOG FlexPen 100 UNIT/ML Sopn  NovoLOG FlexPen 100 UNIT/ML Sopn  OneTouch Delica Lancets 33G Misc  OneTouch Delica Lancets 33G Misc  OneTouch Verio Flex System w/Device Kit  OneTouch Verio Flex System w/Device Kit  OneTouch Verio Strp  OneTouch Verio Strp       Follow-up appointment:   [facility]  [address] [place] [address], [address]  [facility] [address] [phone]  Follow up      Pat Granger MD  [address]  [address]  [facility] [address] [phone]    Schedule an appointment as soon as possible for a visit in 1 week(s)      Appointments for Next 30 Days 1/27/2025 - 2/26/2025      None            Vital signs:  Temp:  [97.5 °F (36.4 °C)-98.5 °F (36.9 °C)] 97.5 °F (36.4 °C)  Pulse:  [52-65] 61  Resp:  [14-18] 17  BP:  (127-158)/(77-98) 127/79  SpO2:  [94 %-100 %] 96 %    Physical Exam:    General: No acute distress.   Respiratory: Clear to auscultation bilaterally. No wheezes. No crackles  Cardiovascular: S1, S2. Regular rate and rhythm. No murmurs  Abdomen: Soft, nontender, nondistended.  Positive bowel sounds. No rebound or guarding  Musculoskeletal: Moves all extremities.  Extremities: No edema.No tenderness of the LE  -----------------------------------------------------------------------------------------------  PATIENT DISCHARGE INSTRUCTIONS: See electronic chart     Assessment/Plan:   75 year old male with PMH sig for HTN, BPH, DULCE MARIA who presents for abnormal labs.      #Acute cystitis  #Generalized weakness  -Continue Rocephin.  Urine culture was not ordered in the ED.  Added onto existing specimen.--> 4 more days of PO abx on dc  -Encourage ambulation     #T2DM w/ hyperglycemia  HbA1C 12.5  -Start weight-based MDI regimen, degludec increased to 28 units nightly, NovoLog 8 units with meals, SSI  -Endocrinology consulted.  Patient will need further education regarding his diabetes and insulin.--> outpt follow up     #JOSSELYN on CKD, resolved  -Likely due to volume depletion from polyuria.  Discontinue IV fluids.  -Trend renal function--. stable     #HTN  -Continue Coreg and clonidine  -Hold lisinopril and Lasix --> resume on dc     #BPH  -Flomax and finasteride    Dispo: discharge     Lavon Larios MD  [facility] Hospitalist  [phone]    Time spent:  > 35 minutes

## 2025-01-27 NOTE — DIETARY NOTE
[facility]   part of [facility]   CLINICAL NUTRITION    Louis Gibson     Admitting diagnosis:  Hyperglycemia [R73.9]  JOSSELYN (acute kidney injury) (HCC) [N17.9]    Ht: 175.3 cm (5' 9.02\")  Wt: 113.4 kg (250 lb).   Body mass index is 36.9 kg/m².  IBW: 72.7 kg    Wt Readings from Last 6 Encounters:   01/25/25 113.4 kg (250 lb)   04/06/22 122.8 kg (270 lb 12.8 oz)   03/24/22 123.5 kg (272 lb 3.2 oz)   01/24/22 121.1 kg (267 lb)   01/21/22 123.8 kg (273 lb)   10/25/21 118.4 kg (261 lb)        Labs/Meds reviewed  -A1c:12.5 (1/24/25), POC Glu:206-296, Glu:181 (Glu:519 on 1/24), BUN:20, Cr:1.47, GFR:50  -Insulin, IV abx    Diet:       Procedures    Carbohydrate controlled diet 1800 kcal/60 grams; Is Patient on Accuchecks? Yes     Percent Meals Eaten (last 3 days)       Date/Time Percent Meals Eaten (%)    01/25/25 0830 100 %    01/26/25 0755 100 %    01/26/25 1500 100 %    01/27/25 0759 100 %          1/27- Pt's wife present at time of diet ed reinforcement. Provided handout on CHO Counting for People with DM. Discussed CHO foods, CHO counting, label reading, sample menu/healthy snack ideas, and planning meals using the DM My Plate Method (1/2 non-starchy veg, 1/4 plate lean protein, & 1/4 CHO). All pt'/pt's wife's nutrition related questions answered at this time.   Recorded PO intakes:100% all meals.  Last BM:1/25. Skin intact.     1/26-Pt chart reviewed d/t hyperglycemia.  75 year old male admitted with hyperglycemia and new DM dx  Visited this AM and reviewed CHO containing foods, CHO/protein and fat in BS control, importance of portion control, timing of meals and appropriate beverages. Admits to drinking large amounts of milk and eating big portions of fruit. Pt had a readiness to learn-no barriers noted- motivated and receptive. Will follow up when wife is present.   Patient reports good appetite at this time.  Nursing notes reports Percent Meals Eaten (%): 100 % intake for last meal.  Tolerating po diet  without diarrhea, emesis, or constipation.   No significant weight changes noted per EPIC though pt states he lost about 17#.     Patient is at low nutrition risk at this time.    Please consult if patient status changes or nutrition issues arise.    Ericka Banks MS, RD, LDN  Clinical Dietitian  Ext:[phone]

## 2025-01-27 NOTE — CONSULTS
[facility]  Diabetes Consult Note    Louis Gibson Patient Status:  Observation    1950 MRN [id number]   Location [facility] 3SW-A Attending Lavon Larios MD   Hosp Day # 0 PCP Pat Granger MD     Reason for Consult:   Management recommendations in setting of new T2DM diagnosis      Provider Requesting Consult:  Dr. Larios ([facility] hospitalist)      Diagnosis:  Patient Active Problem List   Diagnosis    Dyslipidemia    Essential hypertension, benign    Abnormal PSA    DULCE MARIA (obstructive sleep apnea)    Seasonal allergies    Neutropenia, unspecified type (HCC)    BPH with obstruction/lower urinary tract symptoms    Morbid obesity with BMI of 40.0-44.9, adult (HCC)    Insulin resistance    Fatty liver    Prediabetes    Stage 3a chronic kidney disease (HCC)    Hypertensive kidney disease with stage 3 chronic kidney disease, unspecified whether stage 3a or 3b CKD (HCC)    Hyperglycemia    JOSSELYN (acute kidney injury)    Type 2 diabetes mellitus with hyperglycemia, without long-term current use of insulin (HCC)         Medical History:  Past Medical History:    BPH (benign prostatic hyperplasia)    Elevated prostate specific antigen (PSA)    HYPERTENSION    Morbid obesity with BMI of 40.0-44.9, adult (HCC)    Obesity (BMI 30-39.9)    Sleep apnea    UTI (urinary tract infection)    Visual impairment    cataracts     Past Surgical History:   Procedure Laterality Date    Cataract      Colonoscopy N/A 2017    Procedure: COLONOSCOPY, POSSIBLE BIOPSY, POSSIBLE POLYPECTOMY 43211;  Surgeon: Michi Mercado MD;  [facility] [facility]    Other surgical history  08    prostate bx-Dr. Saini    Other surgical history  1986    varicocelectomy    Other surgical history  3/5/15    Cysto-Dr. Saini     Other surgical history  3/18/15    Cysto, TURP w/ Bipolar- Dr. Saini    Other surgical history  3/18/2016    Flow  - Dr. Saini     Other surgical history  16    [facility] Dr Saini    Remv cataract  extracap,insert lens  10/26/2011    Performed [place] SUSANA VIVAS [place] [facility] [place]     Family History   Problem Relation Age of Onset    Heart Disorder Father     Cancer Mother         lung         Diabetes history:  Type:  T2DM  Onset: current admission  Family history of DM: grandmother with T2DM      Allergies: Allergies[1]      Medications: Complete list reviewed. Active diabetes medications include degludec and aspart.      Labs:  Recent Labs   Lab 01/26/25  1158 01/26/25  1700 01/26/25  2106 01/27/25  0509 01/27/25  1258   PGLU 296* 206* 222* 237* 208*     Recent Labs     01/24/25  1859 01/24/25  1900 01/25/25  0626 01/25/25  1401 01/26/25  0500 01/26/25  0506 01/27/25  0514 01/27/25  1258   *  --  137  --  140  --  141  --    CL 97*  --  105  --  108  --  109  --    CO2 24.0  --  22.0  --  22.0  --  21.0  --    BUN 43*  --  35*  --  28*  --  20  --    CREATSERUM 2.09*  --  1.68*  --  1.65*  --  1.47*  --    A1C 12.5*  --   --   --   --   --   --   --    PGLU  --    < >  --    < >  --    < >  --  208*   CA 9.2  --  8.9  --  8.7  --  8.9  --    ALB 3.9  --   --   --  3.9  --   --   --     < > = values in this interval not displayed.                    History of Present Illness: Louis Gibson is a 75 year old male with a PMH of HTN, BPH, DULCE MARIA, obesity (BMI 37), and cataracts admitted 1/24/2025 with hyperglycemia from routine PCP labwork with complaints of polyuria, polydipsia and unintentional weight loss. ED evaluation revealed hyperglycemia (, A1C 12.5%) with no acidosis (anion gap 11), as well as JOSSELYN (Cr 2.09) and UTI.         Assessment/Recommendations:  Upon interview today, patient states he was told by his PCP recently that his glucose values were on the higher side, but was told on Saturday that his glucose was >400 and that he needed to seek emergency care. Explained to patient that, given A1C of 12.5% on admission, recommend continuing basal and bolus insulin at discharge.  Explained differences between basal and bolus insulin, including administration times and doses. Patient states he will need glucometer for meal-time fingersticks; will send prescription to patient's preferred pharmacy. Explained to patient risk of insulin, including hypoglycemia; recommended patient purchase OTC glucose tabs for extenuating circumstances of low glucose. Finally, recommended patient establish care with outpatient endocrinology provider for further T2DM management; patient states they do not live local to hospital and will find closer provider via PCP. Patient states understanding of and willingness to participate in plan of care.       Plan:  Inpatient recommendations -   Degludec = given elevated fasted AM glucose, recommend increasing to 28u every day   Aspart = given elevated AC/HS glucose values yesterday, recommend increasing to 8u fixed dose + high dose sliding scale  Discharge recommendations -   Start basal/bolus insulin:  Tresiba = 28u every day   Novolog = 8u + high dose sliding scale  OneTouch glucometer ordered for pickup at patient's preferred pharmacy      Nursing Recommendations:  RN to teach blood glucose monitoring with bedside glucometer  RN to teach insulin administration with an insulin pen - Reinforce the need to remove 2 caps from the home insulin pen needle   Patient to administer subcutaneously insulin injection with insulin pen under nursing supervision once return demonstration has verified patient's skill  RN to teach survival skills:  Provide \"Understanding Diabetes: Survival Skills and More!\" booklet and have patient/family view video on TV Education channel/Diabetes  Assist the patient/family with access to view the following videos:  \"Everyone Can Count Carbohydrates\" - to view prior to dietitian consult  \"Taking Insulin\" - to view prior to teaching administering a subcutaneous insulin injection      Prescription Recommendations:  Medicare:  Insulin:   Novolog, Fiasp,  Apidra, Admelog, Levemir, Lantus, Basaglar, Tresiba, Toujeo  (If no secondary insurance, may need prior auth)  Supplies:  BD pen needles (Deepika)  Glucometer:  OneTouch      Provider F/U Recommendations:  PCP - Dr. Granger ([place])  Endocrinology/Diabetes Center - patient prefers to stay with PCP at this point      A total of 60 minutes were spent with the patient, 100% was spent counseling and coordinating care for T2 diabetes self-management including nutrition, exercise, blood glucose monitoring, insulin administration, medications, treatment options, follow-up coordination and resources.    Christina Roberson, APRN  1/27/2025  2:18 PM       [1] No Known Allergies

## 2025-01-27 NOTE — PROGRESS NOTES
AVS reviewed, IV removed, verbalized understanding of diabetic education class on Wed. 1/29. Explained Novolog & Lantus insulin & sliding scale, wife present for discharge instructions , both state understanding of how to use glucometer & insulin pens.

## 2025-01-27 NOTE — PLAN OF CARE
Pt A&Ox4, VSS on RA. Tele NSR. SCDs and heparin declined. Denies pain at this time. Up independently. IV abx per order for UTI. Plan to continue monitoring sugars and diabetic education. Diabetes APRN/PA to see. Call light in reach, safety measures in place.

## 2025-01-27 NOTE — TELEPHONE ENCOUNTER
Sarmad aLrios,    Can you please sign the pended order for patient to receive Diabetic Education here at [facility] since patient is closer to our location?    Thank you!

## 2025-01-27 NOTE — PLAN OF CARE
A&Ox4, VSS on room air. Tele: NSR. Diabetes education provided throughout shift. Patient  demonstrated glucose monitoring and injection of insulin pen. Denies pain or discomfort. Plan to DC home today.

## 2025-01-29 ENCOUNTER — DIABETIC EDUCATION (OUTPATIENT)
Facility: CLINIC | Age: 75
End: 2025-01-29
Payer: COMMERCIAL

## 2025-01-29 DIAGNOSIS — E11.65 TYPE 2 DIABETES MELLITUS WITH HYPERGLYCEMIA, WITHOUT LONG-TERM CURRENT USE OF INSULIN (HCC): Primary | ICD-10-CM

## 2025-01-29 PROCEDURE — G0108 DIAB MANAGE TRN  PER INDIV: HCPCS | Performed by: DIETITIAN, REGISTERED

## 2025-01-29 NOTE — PATIENT INSTRUCTIONS
Schedule for the Diabetes Step Class series.      Items to work on before attending Diabetes Step Class 2:    1.) Review and complete the goal sheet. Consider what goal(s) is most important to you.     2.) Continue to check your blood sugars once per day. You can alternate when you test between the two times listed below:    Blood Sugar Target Goals:  Fasting / Pre-meal    2 hours after the start of a meal 140-180    3.) Start using the Plate Method as a model for your meals:  Goal is to make 1/2 of your plate non-starchy vegetables, 1/4 of your plate lean proteins, and 1/4 of your plate carbohydrates   Record a few days of what you are eating for meals and any snacks on the food log sheet      Bring your goal sheet, glucose log sheet, and any completed food log sheets to the next Diabetes Step Class.    We look forward to seeing you in Diabetes Step Class 2! Please contact our office with any questions at [phone].    Insulin, whether in pen or vial form, needs to kept in the refrigerator. Insulin is stable at room temperature for 28 days. Keep your insulin at room temperature while you are actively using it. Once that pen/vial runs out, remove another from the fridge to get to room temperature.     Injecting cold insulin can be uncomfortable.    Here are some other tips for insulin:  - Do not store your insulin near extreme heat or extreme cold.  - Never store insulin in the freezer, direct sunlight, or in the glove compartment of a car.  - Check the expiration date before using, and don't use any insulin beyond its expiration date.  - Examine the insulin closely to make sure the insulin looks normal before you dose.  - Use a new syringe or pen needle for each injection.  - Clean your skin off with an alcohol pad before each injection to prevent infections.    _______________________    INJECTING WITH PEN:    1) Wash your hands before beginning and the wipe the injection site with an alcohol swab or soap  and water.    2) Make sure you have the correct type of insulin and a pen needle.    3) Inspect insulin for any abnormalities and wipe the rubber tip clean with an alcohol swab.    4) Attach a new needle with every injection. Do NOT leave a needle on your pen.    5) Prime your insulin pen by shooting 2 units into the air (NOT YOU). If you do not see insulin come out of the needle, try again. If after the second priming you do not see insulin, get a new needle.    6) Dial the pen up from 0 units to the prescribed dose.    7) Insert the needle into your skin and push the button all the way in. Count to 10 while holding until the window reads 0. Withdraw the needle.    8) Remove pen needle and dispose in appropriate location. Recap pen with cap and store.    _____________________    WHERE TO INJECT:        Be sure to rotate where you are injecting. This ensures that you are getting the full insulin dose and it is being absorbed properly.    Tamika Sow, RD, , LD, CDCES(she/her/hers)  Diabetes Educator  [facility]  [email or web address]  [phone] phone  [phone] Fax

## 2025-01-29 NOTE — PROGRESS NOTES
Louis Gibson  (1950) attended Diabetes Education.    Referring Provider: Lavon Larios         Date: 2025  Start time: 3:30pm End time: 4:50pm  _______________________________________________     Mr. Gibson is a 75 year old male who presents today with type II diabetes.    Reason for visit: post hospitalization      Prev diab edu? no      Assessment:     Wt Readings from Last 6 Encounters:   25 250 lb   22 270 lb 12.8 oz   22 272 lb 3.2 oz   22 267 lb   22 273 lb   10/25/21 261 lb            Lab Results   Component Value Date    A1C 12.5 (H) 2025    A1C 5.6 2020    A1C 5.4 2012        Medical History:   Past Medical History:    BPH (benign prostatic hyperplasia)    Elevated prostate specific antigen (PSA)    HYPERTENSION    Morbid obesity with BMI of 40.0-44.9, adult (HCC)    Obesity (BMI 30-39.9)    Sleep apnea    UTI (urinary tract infection)    Visual impairment    cataracts         Medications:     Current DM management:         Insulin:  Lantus 28 units daily (in 9pm)  Novolog 8+SS units three times daily (with meals )    Type/Dose/Schedule: reviewed  Action/Side Effects/Precautions/Increasing Dosage: reviewed  How to use pen:reviewed    Storage/Disposal: Unopened pens remain refrigerated; once opened, can remain at room temp for 28 days. Need to dispose of needles in sharps container.  Travel Guidelines: Pack insulin & supplies in carry on luggage only;pack medication with label from pharmacy for travel    Injection sites: Abdomen  Lipodystrophy: No    Taking correctly: Yes    Monitoring Blood Glucose:     Currently checking BG: Yes,  3 times/day (before meals)  Reviewed Glucose Logs: Yes    BG results: BG log   FB, 115 mg/dl    Pre Meal: 133,123 mg/dl        After Meals: 133 mg/dl   Before Bed: 211 mg/dl        Continuous Glucose Monitor:  dexcom sample provided    Sensor Type: Dexcom G7    1. Differences in sensor glucose and blood glucose  meter reading.  2. Always verify with a fingerstick if symptoms do not match sensor reading.    2. The sensor calibration process, if applicable  3. How to choose and rotate sensor sites/alternative sites. Injections should be given at least 3 inches away from where sensor is placed.    4. How to handle problems like MRI, CT scans, travel, etc.    5. Phone applications that may assist with using continuous glucose monitor.      Patient was able to set up Dexcom G7 ronal as well as Clarity ronal on their phone. Patient was connected to Dexcom Clarity and is streaming with diabetes clinic.      Patient demonstrated ability to insert and start the sensor successfully.    Patient placed sensor: yes    Sensor Settings:  High Alarm: 240   Low Alarm: 70        Healthy Eating & Exercise:     Diet History:  Usually eats 3 meals/day and 1 snacks/day.      TYPICAL  FOOD  NOTES   Breakfast  turkey sausage, egg, yonatan         Lunch    Dinner roll, chicken, cheddar cheese, fig nuettons        Dinner  turkey mashed pot burssel         Snacks  none         Beverages  peligrino, gatorade zero, water         Eating out    Lunch- nehal, greek          Instructed on basic diet guidelines including aiming for 3 meals/day, balancing each meal with variety of nutrients, using plate method as a model for meals - goal of 1/2 plate non-starchy vegetables, 1/4 plate lean proteins, 1/4 plate carbohydrates and modest portions of fruit, yogurt, milk if desired.    We reviewed impact of carbohydrates, fiber, protein, and fat on glucose levels and trends. We discussed balancing meals and snacks with a combination of carbohydrates, proteins, and fiber to help stabilize glucose levels.     We discussed aiming for consistent meal times/carb amounts can help us determine if medications are correct. Reviewed carbohydrate counting, goals for meals (60 grams per meal) and snacks (20 grams per snack). Reviewed that carb counting consistently for a week or so  can help regain control over glucose.    Carbohydrate counting ronal recommendations provided. Carb \"Cheat Sheet\" provided for quick references.     Physical Activity:  Currently exercising: No   Summer: walking, yard work, discussed increasing    Sleep Patterns: excellent, apnea, sleep feels good not using cpap  Stress: good     Lifestyle & Healthy Coping:     Discussed healthy coping options, resources available, and diabetes distress. Discussed signs of diabetes distress and actions to take at that time. Discussed ways to prevent diabetes distress.       Education:     Diabetes Overview  Reviewed diabetes pathophysiology.  Discussed current A1c result and goals.   Discussed benefits of blood glucose control and blood glucose targets.  Reviewed signs, symptoms, prevention, and treatment of hyperglycemia and hypoglycemia.    Healthy Eating  Reviewed basic nutrition concepts for diabetes management.    Being Active  Benefits and effect of activity on blood glucose discussed.  Reviewed when to call MD and benefits of goal setting.    Monitoring  Reinforced glucose monitoring schedules: before meals  Discussed use of personal CGM and benefits.    Taking Medication  Reviewed medication use, action, timing, and side effects.   Injectables: Site selection, rotation, action, timing reviewed.     Reducing Risk  Reviewed overview of complications including: CV health    Healthy Coping  Family involvement/support encouraged  Depression and diabetes reviewed.      Goals:     Practice healthful eating patterns, emphasizing a variety of nutrient dense foods in appropriate portion sizes.    Take medications as prescribed.  Increase or continue physical activity of at least 150 mins, over at least 3 days a week or per doctors recommendations.   Improve glycemic control working towards an A1c of 7.0% or less  Checking blood glucose with glucose meter as prescribed by provider, 3 times per day (before meals).  Using CGM for detailed  blood glucose monitoring and management.  Strive for Time In Range of 70% or greater.       Blood Glucose Goals  Blood sugar goals: less than 130 mg/dl in the morning (fasting) and less than 180 mg/dl 2 hours after meals.  Keep glucose tabs or fast acting carbohydrates with you for treatment of lows; for blood glucose levels less than 70 mg/dl, take 15 grams of fast acting carbohydrates (3-4 glucose tabs, 4 ounces of juice, or as discussed). Retest in 15 min. If not above 70 mg/dl, retreat.    Recommendations:     Patient goals:  We reviewed insulin injections  We started cgm  I would recommend increasing physical activity     Continue to use the Plate Method as a model for your meals:  1/2 of your plate is non-starchy vegetables, 1/4 of your plate is lean protein, 1/4 of your plate is starchy or carbohydrate foods     Check your blood sugars 3 times per day. Switch off when you check between these two times:  1.) Fasting (before you eat breakfast)  2.) 2 hours after you start eating a meal (vary the meal you test after between breakfast, lunch, and dinner)     Blood glucose guidelines/goals:     ADA (American Diabetes Association):  A1c:  7% or less  Fasting Blood Glucose (before you eat breakfast):    2 Hours After a Meal:  Less than 180     Your doctor may give more specific goals for you.     Recommend follow up with diabetes educator in 1 month. Bring your glucose log book with you to your visits.     Follow up:   Future Appointments   Date Time Provider Department Center   2/26/2025 12:30 PM Tamika Sow RD [facility]         Patient verbalized understanding and has no further questions at this time.    Tamika Sow RD, , LD, CDCES

## 2025-02-26 ENCOUNTER — DIABETIC EDUCATION (OUTPATIENT)
Facility: CLINIC | Age: 75
End: 2025-02-26
Payer: COMMERCIAL

## 2025-02-26 DIAGNOSIS — E11.65 TYPE 2 DIABETES MELLITUS WITH HYPERGLYCEMIA, WITHOUT LONG-TERM CURRENT USE OF INSULIN (HCC): Primary | ICD-10-CM

## 2025-02-26 PROCEDURE — G0108 DIAB MANAGE TRN  PER INDIV: HCPCS | Performed by: DIETITIAN, REGISTERED

## 2025-02-26 NOTE — PATIENT INSTRUCTIONS
It was great meeting with you today! I am so grateful we were able to meet and talk about your diabetes!  Great job with all of the changes you have made already!  Keep working on getting started exercising. That will really help your blood sugars!  Only take your novolog if you are eating a meal  You can have around 45 g of carbs at meals    Blood Glucose Goals  Between  mg/dl in the morning  Less than 180 mg/dl 2 hours after meals.  (You and your doctor might have discussed more specific goals)    Treating low blood sugars and high blood sugars  Always check your blood sugar if you feel any symptoms of a low blood sugar.      If you have a low blood sugar (less than 70 mg/dL) follow the \"Rule of 15\" to treat it:  1.) Take 15 grams of a quick acting carbohydrate (1 tablespoon of honey, 3-4 glucose tablets, 1/2 cup fruit juice, 1/2 can regular soda, or to-go pouch of applesauce). Only eat ONE 15g SERVING of a quick acting carbohydrate.  2.) Then check your blood sugar again after 15 minutes of drinking or eating the quick acting carbohydrate to be sure your blood sugar is above 70 mg/dL.   3.) If your blood sugar is still less than 70 mg/dL, repeat treatment of 15 grams of a quick acting carbohydrate (1 tablespoon of honey, 3-4 glucose tablets, 1/2 cup fruit juice, 1/2 can regular soda, or to-go pouch  of applesauce).  4.) If your next meal is more than one hour away, eat a small snack. Try to have some protein and complex carbohydrate (peanut butter crackers, pretzels and cheese, etc).   5.) If you are not sure what caused your low blood sugar, call your healthcare provider.  6.) Always check your blood sugar before you drive.     _______________________________________________       To treat a low, we recommend you carry with you easy, pre-portioned treatment for low blood sugars that are 15G of carbs:   - Children sized squeeze pouch applesauce (high fiber + carbs help prevent too high of a spike)  - Small  children's sized juicebox- 15g carb --> 4oz juice box  - Glucose tablets from [employer], you can find them near diabetes supplies --> Note, you will need to eat 3-4 tablets to get to 15g of carbs  - Children sized fruit snack pack- look for one with 15 grams of total carbohydrate     AVOID complex carbs, or foods that contain fats along with carbs (like chocolate) can slow the absorption of glucose and should NOT be used to treat an emergency low.    You are doing amazing! Keep up the great work!    Tamika Sow, RD, , LD, CDCES(she/her/hers)  Diabetes Educator  [facility], [place]  [email or web address]  [phone] phone  [phone] Fax

## 2025-02-26 NOTE — PROGRESS NOTES
Louis Gibson  (1950) attended Diabetes Education.    Referring Provider: Lavon Larios         Date: 25  Start time: 12:30pm  End time:  1:30pm  _______________________________________________     Mr. Gibson is a 75 year old male who presents today with type II diabetes.    Reason for visit: follow up on post hospitalization      Prev diab edu? yes      Assessment:     Wt Readings from Last 6 Encounters:   25 250 lb   22 270 lb 12.8 oz   22 272 lb 3.2 oz   22 267 lb   22 273 lb   10/25/21 261 lb            Lab Results   Component Value Date    A1C 12.5 (H) 2025    A1C 5.6 2020    A1C 5.4 2012        Medical History:   Past Medical History:    BPH (benign prostatic hyperplasia)    Elevated prostate specific antigen (PSA)    HYPERTENSION    Morbid obesity with BMI of 40.0-44.9, adult (HCC)    Obesity (BMI 30-39.9)    Sleep apnea    UTI (urinary tract infection)    Visual impairment    cataracts         Medications:     Current DM management:         Insulin:  Lantus 28 units daily (in 9pm)  Novolog 1:20>140 units three times daily (with meals )        Injection sites: Abdomen  Lipodystrophy: No    Taking correctly: Yes    Monitoring Blood Glucose:     Currently checking BG: Yes,  3 times/day (before meals)  Reviewed Glucose Logs: Yes    BG results: BG log   FB, 115 mg/dl    Pre Meal: 133,123 mg/dl        After Meals: 133 mg/dl   Before Bed: 211 mg/dl        Continuous Glucose Monitor:                  Healthy Eating & Exercise:     Diet History:  Usually eats 3 meals/day and 1 snacks/day.      TYPICAL  FOOD  NOTES   Breakfast  turkey sausage, egg, yonatan         Lunch    Dinner roll, chicken, cheddar cheese, fig nuettons        Dinner  turkey mashed pot burssel      Portions    Snacks  None, rebel ice cream         Beverages  peligrino, gatorade zero, water         Eating out    Lunch- savory, greek          Instructed on basic diet guidelines including  aiming for 3 meals/day, balancing each meal with variety of nutrients, using plate method as a model for meals - goal of 1/2 plate non-starchy vegetables, 1/4 plate lean proteins, 1/4 plate carbohydrates and modest portions of fruit, yogurt, milk if desired.    We reviewed impact of carbohydrates, fiber, protein, and fat on glucose levels and trends. We discussed balancing meals and snacks with a combination of carbohydrates, proteins, and fiber to help stabilize glucose levels.     We discussed aiming for consistent meal times/carb amounts can help us determine if medications are correct. Reviewed carbohydrate counting, goals for meals (60 grams per meal) and snacks (20 grams per snack). Reviewed that carb counting consistently for a week or so can help regain control over glucose.    Carbohydrate counting ronal recommendations provided. Carb \"Cheat Sheet\" provided for quick references.     Physical Activity:  Currently exercising: No- working on setting up gym   Summer: walking, yard work, discussed increasing    Sleep Patterns: excellent, apnea, sleep feels good not using cpap  Stress: good     Lifestyle & Healthy Coping:     Discussed healthy coping options, resources available, and diabetes distress. Discussed signs of diabetes distress and actions to take at that time. Discussed ways to prevent diabetes distress.       Education:     Diabetes Overview  Reviewed diabetes pathophysiology.  Discussed current A1c result and goals.   Discussed benefits of blood glucose control and blood glucose targets.  Reviewed signs, symptoms, prevention, and treatment of hyperglycemia and hypoglycemia.    Healthy Eating  Reviewed basic nutrition concepts for diabetes management.    Being Active  Benefits and effect of activity on blood glucose discussed.  Reviewed when to call MD and benefits of goal setting.    Monitoring  Reinforced glucose monitoring schedules: before meals  Discussed use of personal CGM and  benefits.    Taking Medication  Reviewed medication use, action, timing, and side effects.   Injectables: Site selection, rotation, action, timing reviewed.     Reducing Risk  Reviewed overview of complications including: CV health    Healthy Coping  Family involvement/support encouraged  Depression and diabetes reviewed.      Goals:     Practice healthful eating patterns, emphasizing a variety of nutrient dense foods in appropriate portion sizes.    Take medications as prescribed.  Increase or continue physical activity of at least 150 mins, over at least 3 days a week or per doctors recommendations.   Improve glycemic control working towards an A1c of 7.0% or less  Checking blood glucose with glucose meter as prescribed by provider, 3 times per day (before meals).  Using CGM for detailed blood glucose monitoring and management.  Strive for Time In Range of 70% or greater.       Blood Glucose Goals  Blood sugar goals: less than 130 mg/dl in the morning (fasting) and less than 180 mg/dl 2 hours after meals.  Keep glucose tabs or fast acting carbohydrates with you for treatment of lows; for blood glucose levels less than 70 mg/dl, take 15 grams of fast acting carbohydrates (3-4 glucose tabs, 4 ounces of juice, or as discussed). Retest in 15 min. If not above 70 mg/dl, retreat.    Recommendations:     Patient goals:  Great job with all of the changes you have made already!  Keep working on getting started exercising. That will really help your blood sugars!  Only take your novolog if you are eating a meal  You can have around 45 g of carbs at meals    Continue to use the Plate Method as a model for your meals:  1/2 of your plate is non-starchy vegetables, 1/4 of your plate is lean protein, 1/4 of your plate is starchy or carbohydrate foods     Check your blood sugars 3 times per day. Switch off when you check between these two times:  1.) Fasting (before you eat breakfast)  2.) 2 hours after you start eating a meal  (vary the meal you test after between breakfast, lunch, and dinner)     Blood glucose guidelines/goals:     ADA (American Diabetes Association):  A1c:  7% or less  Fasting Blood Glucose (before you eat breakfast):    2 Hours After a Meal:  Less than 180     Your doctor may give more specific goals for you.     Recommend follow up with diabetes educator in 1 month. Bring your glucose log book with you to your visits.     Follow up:   Future Appointments   Date Time Provider Department Center   8/27/2025 12:30 PM Tamika Sow RD [facility]           Patient verbalized understanding and has no further questions at this time.    Tamika Sow RD, , LD, CDCES

## 2025-08-27 ENCOUNTER — DIABETIC EDUCATION (OUTPATIENT)
Facility: CLINIC | Age: 75
End: 2025-08-27

## 2025-08-27 DIAGNOSIS — E11.65 TYPE 2 DIABETES MELLITUS WITH HYPERGLYCEMIA, WITHOUT LONG-TERM CURRENT USE OF INSULIN (HCC): Primary | ICD-10-CM

## 2025-08-27 PROCEDURE — G0108 DIAB MANAGE TRN  PER INDIV: HCPCS | Performed by: DIETITIAN, REGISTERED

## (undated) NOTE — Clinical Note
Thank you so much for the referral. Patient seen for diabetes education today, please refer to note for specifics. Follow up indicated in 1 month.